# Patient Record
(demographics unavailable — no encounter records)

---

## 2025-07-15 NOTE — HISTORY OF PRESENT ILLNESS
[de-identified] : This 50-year-old woman who denies a prior history of back problems is seen in the office today along with her mother.  1 month ago she had the onset of lower back pain and then 1 week ago the pain spread to her left leg.  She has not had right leg pain.  The left leg pain is graded as a 10 and the back pain is a 9.  She has had some intermittent numbness and tingling in the left leg but denies weakness.  The pain is worse with coughing, sneezing and forcing to move her bowels and she has had night pain.  The pain is worse sitting as well as worse standing for 5 minutes and worse walking.  She was seen in an urgent care.  She had a prior hysterectomy for fibroids in 2020.  She gets rare heartburn.  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 [Pain Location] : pain [Worsening] : worsening [10] : a maximum pain level of 10/10 [Walking] : walking [Sitting] : sitting [Standing] : standing

## 2025-07-15 NOTE — PHYSICAL EXAM
[de-identified] : She is fully alert and oriented with a normal mood and affect.  She is in obvious distress from her back pain even moving around on the examining table.  She can stand with some help and she can tiptoe and heel walk with assistance.  There are no cutaneous abnormalities or palpable bony defects of the spine.  There is no evidence of shortness of breath or respiratory distress.  There is no paravertebral muscle spasm.  Surprisingly there is no sciatic notch tenderness and there is no trochanteric tenderness.  Forward flexion of the spine is markedly limited to only 30 degrees or so by back and left leg pain.  Lower extremity neurological examination revealed 2-3+ symmetrical knee jerks.  Ankle jerk is 2+ on the right and absent on the left.  Motor power is normal to manual testing in all lower extremity groups and sensation is normal to light touch in all dermatomes.  Straight leg raising was negative to 90 degrees in the sitting position on the right but caused back and left leg pain at 70 degrees on the left.  Hip range of motion causes discomfort in the back.  Knee range of motion was full and painless.  Vascular examination showed no evidence of varicosities there is no lymphedema. [de-identified] : She showed me AP and lateral x-rays of the lumbar spine on her telephone that were done in the urgent care

## 2025-07-15 NOTE — DISCUSSION/SUMMARY
[Medication Risks Reviewed] : Medication risks reviewed [de-identified] : Her history and exam is consistent with a herniated lumbar disc at L5-S1.  She will rest and use moist heat.  She has been started on diclofenac 75 mg twice a day as a nonsteroidal anti-inflammatory and will use extra strength Tylenol for pain control.  She will call if there are problems with the medication or worsening of her symptoms and I will see her for follow-up in 3 weeks.  We discussed that she has an 80 or 90% chance of getting better with nonsurgical treatment.  We discussed that if she fails to make satisfactory progress with the nonsteroidal anti-inflammatory medications the next step could be a course of oral prednisone and if that does not work she will need an imaging study prior to seeing a pain management doctor for epidural steroid injections.

## 2025-07-15 NOTE — REASON FOR VISIT
[Initial Visit] : an initial visit for [Herniated Discs] : herniated discs [Back Pain] : back pain [Radiculopathy] : radiculopathy [Family Member] : family member